# Patient Record
Sex: FEMALE | Race: WHITE | NOT HISPANIC OR LATINO | URBAN - METROPOLITAN AREA
[De-identification: names, ages, dates, MRNs, and addresses within clinical notes are randomized per-mention and may not be internally consistent; named-entity substitution may affect disease eponyms.]

---

## 2017-06-29 ENCOUNTER — OFFICE (OUTPATIENT)
Dept: URBAN - METROPOLITAN AREA CLINIC 78 | Facility: CLINIC | Age: 62
End: 2017-06-29

## 2017-06-29 VITALS
WEIGHT: 208 LBS | HEART RATE: 90 BPM | DIASTOLIC BLOOD PRESSURE: 95 MMHG | SYSTOLIC BLOOD PRESSURE: 165 MMHG | HEIGHT: 66 IN | TEMPERATURE: 98.5 F

## 2017-06-29 DIAGNOSIS — E11.9 TYPE 2 DIABETES MELLITUS WITHOUT COMPLICATIONS: ICD-10-CM

## 2017-06-29 DIAGNOSIS — Z86.010 PERSONAL HISTORY OF COLONIC POLYPS: ICD-10-CM

## 2017-06-29 PROCEDURE — 99243 OFF/OP CNSLTJ NEW/EST LOW 30: CPT

## 2017-06-29 NOTE — SERVICEHPINOTES
AKASH OTERO   is a   61   year old    female who is being seen in consultation at the request of   N[facility]IA NERI   to establish GI care here. She reports an initial colonoscopy with our group years ago but then had ended up at the other local GI group where she reports having had several colonoscopies. She is unhappy with her care there and wants to see us instead. She reports her last colonoscopy was in 2016. No current complaints today. Reports 1-2 BMs per day, formed or soft, without blood. She has family h/o colon cancer in her mother, diagnosed around age 60. Patient herself reports a h/o "pre-cancerous" colon polyp. Was being advised to have very frequent colonoscopies but she doesn't feel that is necessary.

## 2023-11-14 ENCOUNTER — OFFICE (OUTPATIENT)
Dept: URBAN - METROPOLITAN AREA CLINIC 34 | Facility: CLINIC | Age: 68
End: 2023-11-14
Payer: OTHER GOVERNMENT

## 2023-11-14 VITALS
HEART RATE: 79 BPM | HEIGHT: 66 IN | DIASTOLIC BLOOD PRESSURE: 77 MMHG | WEIGHT: 183 LBS | TEMPERATURE: 97.9 F | SYSTOLIC BLOOD PRESSURE: 140 MMHG

## 2023-11-14 DIAGNOSIS — R05.9 COUGH, UNSPECIFIED: ICD-10-CM

## 2023-11-14 DIAGNOSIS — Z86.010 PERSONAL HISTORY OF COLONIC POLYPS: ICD-10-CM

## 2023-11-14 DIAGNOSIS — R09.89 OTHER SPECIFIED SYMPTOMS AND SIGNS INVOLVING THE CIRCULATORY: ICD-10-CM

## 2023-11-14 DIAGNOSIS — Z98.84 BARIATRIC SURGERY STATUS: ICD-10-CM

## 2023-11-14 DIAGNOSIS — Z80.0 FAMILY HISTORY OF MALIGNANT NEOPLASM OF DIGESTIVE ORGANS: ICD-10-CM

## 2023-11-14 PROCEDURE — 99204 OFFICE O/P NEW MOD 45 MIN: CPT | Performed by: NURSE PRACTITIONER

## 2023-11-14 RX ORDER — PANTOPRAZOLE SODIUM 40 MG/1
TABLET, DELAYED RELEASE ORAL
Qty: 30 | Refills: 5 | Status: ACTIVE
Start: 2023-11-14